# Patient Record
Sex: MALE | Race: WHITE
[De-identification: names, ages, dates, MRNs, and addresses within clinical notes are randomized per-mention and may not be internally consistent; named-entity substitution may affect disease eponyms.]

---

## 2017-01-19 ENCOUNTER — HOSPITAL ENCOUNTER (EMERGENCY)
Dept: HOSPITAL 62 - ER | Age: 27
Discharge: HOME | End: 2017-01-19
Payer: MEDICARE

## 2017-01-19 VITALS — SYSTOLIC BLOOD PRESSURE: 110 MMHG | DIASTOLIC BLOOD PRESSURE: 70 MMHG

## 2017-01-19 DIAGNOSIS — R06.7: ICD-10-CM

## 2017-01-19 DIAGNOSIS — J06.9: Primary | ICD-10-CM

## 2017-01-19 DIAGNOSIS — B97.89: ICD-10-CM

## 2017-01-19 DIAGNOSIS — R09.89: ICD-10-CM

## 2017-01-19 DIAGNOSIS — R05: ICD-10-CM

## 2017-01-19 PROCEDURE — 71020: CPT

## 2017-01-19 PROCEDURE — 99283 EMERGENCY DEPT VISIT LOW MDM: CPT

## 2017-01-19 NOTE — ER DOCUMENT REPORT
ED General





- General


Chief Complaint: Cough


Stated Complaint: COUGH


Mode of Arrival: Ambulatory


Notes: 


26-year-old male here with complaints of cough productive of clear sputum 

sneezing runny nose over the past 2 weeks.  He has not had any fevers or 

chills.  He states that his grandmother was initially sick with similar 

symptoms and then he became sick.  His mother then developed the same symptoms 

however she improved within 7 days.  He has tried TheraFlu and DayQuil with 

some relief.  He has also tried Tessalon Perles and "they are absolutely 

wonderful".


TRAVEL OUTSIDE OF THE [place] IN LAST 30 DAYS: No





- Related Data


Allergies/Adverse Reactions: 


 





Penicillins Allergy (Severe, Verified 01/19/17 10:17)


 Hives











Past Medical History





- General


Information source: Patient





- Social History


Smoking Status: Never Smoker


Chew tobacco use (# tins/day): No


Frequency of alcohol use: None


Drug Abuse: None


Family History: Reviewed & Not Pertinent


Patient has suicidal ideation: No


Patient has homicidal ideation: No





- Past Medical History


Cardiac Medical History: 


   Denies: Hx Coronary Artery Disease, Hx Heart Attack, Hx Hypertension


Pulmonary Medical History: 


   Denies: Hx Asthma, Hx Bronchitis, Hx COPD, Hx Pneumonia


Neurological Medical History: Reports: Hx Seizures - as child.  Denies: Hx 

Cerebrovascular Accident


Renal/ Medical History: Denies: Hx Peritoneal Dialysis


GI Medical History: Denies: Hx Hepatitis, Hx Hiatal Hernia, Hx Ulcer


Musculoskeltal Medical History: Denies Hx Arthritis


Infectious Medical History: Denies: Hx Hepatitis


Past Surgical History: Denies: Hx Open Heart Surgery, Hx Pacemaker





- Immunizations


Hx Diphtheria, Pertussis, Tetanus Vaccination: Yes





Review of Systems





- Review of Systems


Notes: 


See history of present illness for pertinent positive review of systems; 

otherwise all review of systems have been reviewed and are negative





Physical Exam





- Vital signs


Vitals: 


 











Temp Pulse Resp BP Pulse Ox


 


 98.8 F   122 H  16   98/71 L  97 


 


 01/19/17 10:19  01/19/17 10:19  01/19/17 10:19  01/19/17 10:19 01/19/17 10:19














- Notes


Notes: 


PHYSICAL EXAMINATION:





GENERAL: Well-appearing and in no acute distress.





HEAD: Atraumatic, normocephalic.





EYES: Pupils equal round and reactive to light, extraocular movements intact, 

sclera anicteric, conjunctiva are normal.





ENT: nares patent, oropharynx minimal erythema without tonsillar swelling or 

exudates.  Moist mucous membranes.





NECK: Normal range of motion, supple without lymphadenopathy





LUNGS: CTAB and equal.  No wheezes rales or rhonchi.





HEART: Regular rhythm without murmurs; mildly tachycardic





ABDOMEN: Soft, no tenderness.  No guarding, no rebound





EXTREMITIES: Normal range of motion, no pitting edema.  No cyanosis.





NEUROLOGICAL: Cranial nerves grossly intact. Normal sensory/motor exams.





PSYCH: Normal mood, normal affect.





SKIN: Warm, Dry, normal turgor, no rashes or lesions noted





Course





- Re-evaluation


Re-evalutation: 


01/19/17 10:44


MEDICAL DECISION MAKING:


Concern for upper respiratory infection versus pneumonia versus allergic 

rhinitis


Will obtain x-ray to evaluate for pneumonia and give dose of Tessalon Perle


Patient understands and agrees to the plan of care





01/19/17 11:33


CXR negative for pneumonia


Will treat w azithromycin as this may be walking pneumonia


Instructed f/u PCP next few days


Home w rx azithromycin and tessalon perles





- Vital Signs


Vital signs: 


 











Temp Pulse Resp BP Pulse Ox


 


 98.8 F   122 H  20   98/71 L  97 


 


 01/19/17 10:19  01/19/17 10:19  01/19/17 10:45  01/19/17 10:19  01/19/17 10:19














Discharge





- Discharge


Clinical Impression: 


Upper respiratory infection


Qualifiers:


 URI type: unspecified viral URI Qualified Code(s): J06.9 - Acute upper 

respiratory infection, unspecified; B97.89 - Other viral agents as the cause of 

diseases classified elsewhere





Condition: Good


Disposition: HOME, SELF-CARE


Additional Instructions: 


You were seen in the emergency department at [facility].  Finish 

antibiotics and do not skip any doses.  Use the cough medication as needed.  

Please followup with your primary physician in the next few days for further 

management/evaluation.  Please return to the emergency department for worsening 

of symptoms or any symptom that you deem to be concerning or life-threatening.  

Thank you for allowing us to be part of your care.


Prescriptions: 


Benzonatate [Tessalon Perle 100 mg Capsule] 100 mg PO Q8HP PRN #20 cap


 PRN Reason: 


Azithromycin [Zithromax 250 mg Tablet] 250 mg PO ASDIR #4 tablet

## 2017-01-19 NOTE — ER DOCUMENT REPORT
ED Medical Screen (RME)





- General


Chief Complaint: Cough


Stated Complaint: COUGH


Time seen by provider: 10:20


Mode of Arrival: Ambulatory


Information source: Patient


Notes: 


36-year-old male presents to ED for cough cold for 2 weeks.  Denies fever 

nausea or vomiting denies chest pain.


TRAVEL OUTSIDE OF THE [place] IN LAST 30 DAYS: No





- HPI


Onset: Other - 2 weeks


Onset/Duration: Gradual, Persistent


Quality of pain: No pain


Severity: None


Pain Level: Denies


Associated Symptoms: Cough (nonproductive).  denies: Body/muscle aches, Fever


Exacerbated by: Denies


Relieved by: Denies


Similar symptoms previously: Yes


Recently seen / treated by doctor: No





- Related Data


Smoking: Non-smoker


Frequency of alcohol use: None


Drug Abuse: None


Allergies/Adverse Reactions: 


 





Penicillins Allergy (Severe, Verified 01/19/17 10:17)


 Hives











Past Medical History





- Past Medical History


Cardiac Medical History: 


   Denies: Hx Coronary Artery Disease, Hx Heart Attack, Hx Hypertension


Pulmonary Medical History: 


   Denies: Hx Asthma, Hx Bronchitis, Hx COPD, Hx Pneumonia


Neurological Medical History: Reports: Hx Seizures - as child.  Denies: Hx 

Cerebrovascular Accident


GI Medical History: Denies: Hx Hepatitis, Hx Hiatal Hernia, Hx Ulcer


Musculoskeltal Medical History: Denies Hx Arthritis


Infectious Medical History: Denies: Hx Hepatitis


Past Surgical History: Denies: Hx Open Heart Surgery, Hx Pacemaker





- Immunizations


Hx Diphtheria, Pertussis, Tetanus Vaccination: Yes

## 2017-02-09 ENCOUNTER — HOSPITAL ENCOUNTER (EMERGENCY)
Dept: HOSPITAL 62 - ER | Age: 27
Discharge: HOME | End: 2017-02-09
Payer: MEDICARE

## 2017-02-09 VITALS — DIASTOLIC BLOOD PRESSURE: 65 MMHG | SYSTOLIC BLOOD PRESSURE: 100 MMHG

## 2017-02-09 DIAGNOSIS — R05: ICD-10-CM

## 2017-02-09 DIAGNOSIS — R09.81: ICD-10-CM

## 2017-02-09 DIAGNOSIS — J45.909: Primary | ICD-10-CM

## 2017-02-09 PROCEDURE — 99281 EMR DPT VST MAYX REQ PHY/QHP: CPT

## 2017-02-09 NOTE — ER DOCUMENT REPORT
ED Medical Screen (RME)





- General


Stated Complaint: COUGH


Notes: 


patient has a history of asthma and has run out nebulizers for atrovent. 





lungs CTAB








I have greeted and performed a rapid initial assessment of this patient. A 

comprehensive ED assessment and evaluation of the patient, analysis of test 

results and completion of the medical decision making process will be conducted 

by additional ED providers.


TRAVEL OUTSIDE OF THE [place] IN LAST 30 DAYS: No





- Related Data


Allergies/Adverse Reactions: 


 





Penicillins Allergy (Severe, Verified 01/19/17 10:17)


 Hives











Past Medical History





- Past Medical History


Cardiac Medical History: 


   Denies: Hx Coronary Artery Disease, Hx Heart Attack, Hx Hypertension


Pulmonary Medical History: 


   Denies: Hx Asthma, Hx Bronchitis, Hx COPD, Hx Pneumonia


Neurological Medical History: Reports: Hx Seizures - as child.  Denies: Hx 

Cerebrovascular Accident


Renal/ Medical History: Denies: Hx Peritoneal Dialysis


GI Medical History: Denies: Hx Hepatitis, Hx Hiatal Hernia, Hx Ulcer


Musculoskeltal Medical History: Denies Hx Arthritis


Infectious Medical History: Denies: Hx Hepatitis


Past Surgical History: Denies: Hx Open Heart Surgery, Hx Pacemaker





- Immunizations


Hx Diphtheria, Pertussis, Tetanus Vaccination: Yes

## 2017-02-09 NOTE — ER DOCUMENT REPORT
ED General





- General


Chief Complaint: Medication Refill


Stated Complaint: COUGH


Notes: 


Patient is a 26-year-old male presents with complaint of being out of his 

asthma inhaler.  He's had some coughing congestion.  No wheezing.  No fevers.  

No other complaints at this time.  He presents with a text from his mom on this 

found that he has been read.  The text says that he needs to inhalers.  He 

needs one for school and 1 for gym.  She asks that we label the inhalers as 1 

for gym.  She also text to ask if he needed antibiotics or prednisone.  Patient 

himself says he otherwise feels well.  His no further concerns.  He says he has 

a learning disability and that is why he wanted to repeat a text from his mom 

in case he forgot something to tell me.


TRAVEL OUTSIDE OF THE [place] IN LAST 30 DAYS: No





- Related Data


Allergies/Adverse Reactions: 


 





Penicillins Allergy (Severe, Verified 02/09/17 21:14)


 Hives











Past Medical History





- Social History


Smoking Status: Unknown if Ever Smoked


Frequency of alcohol use: None


Drug Abuse: None


Family History: Reviewed & Not Pertinent


Patient has suicidal ideation: No


Patient has homicidal ideation: No





- Past Medical History


Cardiac Medical History: 


   Denies: Hx Coronary Artery Disease, Hx Heart Attack, Hx Hypertension


Pulmonary Medical History: 


   Denies: Hx Asthma, Hx Bronchitis, Hx COPD, Hx Pneumonia


Neurological Medical History: Reports: Hx Seizures - as child.  Denies: Hx 

Cerebrovascular Accident


Renal/ Medical History: Denies: Hx Peritoneal Dialysis


GI Medical History: Denies: Hx Hepatitis, Hx Hiatal Hernia, Hx Ulcer


Musculoskeltal Medical History: Denies Hx Arthritis


Infectious Medical History: Denies: Hx Hepatitis


Past Surgical History: Denies: Hx Open Heart Surgery, Hx Pacemaker





- Immunizations


Hx Diphtheria, Pertussis, Tetanus Vaccination: Yes





Review of Systems





- Review of Systems


Notes: 


My Normal Review Basic





REVIEW OF SYSTEMS:


CONSTITUTIONAL :  Denies fever,  chills, or sweats.  Denies recent illness.


EENT: Some nasal congestion


RESPIRATORY: Some cough


GASTROINTESTINAL:  Denies abdominal pain.  Denies nausea, vomiting, or 

diarrhea.  Denies constipation.  Last BM: 


MUSCULOSKELETAL:  Denies neck or back pain or joint pain or swelling.


SKIN:   Denies rash or skin lesions.


NEUROLOGICAL:  Denies altered mental status or loss of consciousness.  


ALL OTHER SYSTEMS REVIEWED AND NEGATIVE.





Physical Exam





- Vital signs


Vitals: 


 











Temp Pulse Resp BP Pulse Ox


 


 97.8 F   77   18   100/65   98 


 


 02/09/17 21:14  02/09/17 21:14  02/09/17 21:14  02/09/17 21:14  02/09/17 21:14














- Notes


Notes: 


General Appearance: Well nourished, alert, cooperative, no acute distress, no 

obvious discomfort.  Very well-appearing.  No cough or nasal congestion during 

exam.


Vitals: reviewed, See vital signs table.


Head: no swelling or tenderness to the head


Eyes: PERRL, EOMI, Conjuctiva clear


Mouth: No decreasd moisture


Throat: No tonsillar inflammation, No airway obstruction,  No lymphadenopathy


Neck: Supple, no neck tenderness, No thyromegaly


Lungs: No wheezing, No rales, No rhonci, No accessory muscle use, good air 

exchange bilaterally.


Heart: Normal rate, Regular rythm, No murmur, no rub


Abdomen: Normal BS, soft, No rigidity, No abdominal tenderness, No guarding, no 

rebound, no abdominal masses, no organomegaly


Extremities:  good pulses in all extremities, no swelling or tenderness in the 

extremities, no edema.


Skin: warm, dry, appropriate color, no rash


Neuro: speech clear, oriented x 3, normal affect, responds appropriately to 

questions.





Course





- Vital Signs


Vital signs: 


 











Temp Pulse Resp BP Pulse Ox


 


 97.8 F   77   18   100/65   98 


 


 02/09/17 21:14  02/09/17 21:14  02/09/17 21:14  02/09/17 21:14  02/09/17 21:14














- Transfer of Care


Notes: 





02/09/17 22:45


Patient is very well-appearing.  He has no wheezing on exam.  His no increased 

work of breathing.  He had no cough during exam.  His no congestion on exam.  I 

did give him 1 inhaler here and wrote a prescription for second inhaler.  I 

informed him to have his mother labeled inhalers for him as to which one he 

should he wants for gym in which one he wants for other.  Patient has no 

wheezing and no fever.  I do not think he needs steroids or antibiotics this 

time.  Patient encouraged return to ER if he feels worse or has any further 

concerns.  Patient agrees with plan will be discharged home.





Dictation of this chart was performed using voice recognition software; 

therefore, there may be some unintended grammatical errors.





Discharge





- Discharge


Clinical Impression: 


Asthma


Qualifiers:


 Asthma severity: unspecified severity Asthma complication type: uncomplicated 

Qualified Code(s): J45.909 - Unspecified asthma, uncomplicated





Condition: Good


Disposition: HOME, SELF-CARE


Additional Instructions: 


Please return to ER immediately if you have a lot of wheezing, difficulty 

breathing, fevers, or feel unwell.  You can use your inhaler as 2 puffs every 4 

hours as needed for difficulty breathing.


Prescriptions: 


Albuterol Sulfate [Proair HFA Inhalation Aerosol 8.5 gm MDI] 2 puff IH Q4H PRN #

1 mdi


 PRN Reason: 


Forms:  Return to School

## 2017-02-11 ENCOUNTER — HOSPITAL ENCOUNTER (EMERGENCY)
Dept: HOSPITAL 62 - ER | Age: 27
Discharge: HOME | End: 2017-02-11
Payer: MEDICAID

## 2017-02-11 VITALS — SYSTOLIC BLOOD PRESSURE: 113 MMHG | DIASTOLIC BLOOD PRESSURE: 65 MMHG

## 2017-02-11 DIAGNOSIS — J45.909: Primary | ICD-10-CM

## 2017-02-11 DIAGNOSIS — Z88.0: ICD-10-CM

## 2017-02-11 DIAGNOSIS — J04.30: ICD-10-CM

## 2017-02-11 PROCEDURE — 99283 EMERGENCY DEPT VISIT LOW MDM: CPT

## 2017-02-11 NOTE — ER DOCUMENT REPORT
ED General





- General


Chief Complaint: Flu Symptoms


Stated Complaint: FLU LIKE SYMPTOMS


TRAVEL OUTSIDE OF THE [place] IN LAST 30 DAYS: No





- HPI


Patient complains to provider of: cough sinus pressure wheezing


Notes: 


Patient coming in with about states symptoms ongoing for the last 3-4 days.  

Patient states he was recent seen and was given a prescription for an inhaler 

stated that he also at that time thought he would improve with prednisone and 

want to ask for vials of albuterol however stated that the provider was dizzy 

before the past with his medications.  Patient otherwise states no fevers no 

recent antibiotics no recent steroids.  Patient otherwise is alert


Distress upon my evaluation.





- Related Data


Allergies/Adverse Reactions: 


 





Penicillins Allergy (Severe, Verified 02/09/17 21:14)


 Hives











Past Medical History





- Social History


Smoking Status: Unknown if Ever Smoked


Family History: Reviewed & Not Pertinent


Patient has suicidal ideation: No


Patient has homicidal ideation: No





- Past Medical History


Cardiac Medical History: 


   Denies: Hx Coronary Artery Disease, Hx Heart Attack, Hx Hypertension


Pulmonary Medical History: 


   Denies: Hx Asthma, Hx Bronchitis, Hx COPD, Hx Pneumonia


Neurological Medical History: Reports: Hx Seizures - as child.  Denies: Hx 

Cerebrovascular Accident


Renal/ Medical History: Denies: Hx Peritoneal Dialysis


GI Medical History: Denies: Hx Hepatitis, Hx Hiatal Hernia, Hx Ulcer


Musculoskeltal Medical History: Denies Hx Arthritis


Infectious Medical History: Denies: Hx Hepatitis


Past Surgical History: Denies: Hx Open Heart Surgery, Hx Pacemaker





- Immunizations


Hx Diphtheria, Pertussis, Tetanus Vaccination: Yes





Review of Systems





- Review of Systems


Constitutional: No symptoms reported


EENT: No symptoms reported


Cardiovascular: No symptoms reported


Respiratory: Cough


Gastrointestinal: No symptoms reported


Genitourinary: No symptoms reported


Male Genitourinary: No symptoms reported


Musculoskeletal: No symptoms reported


Skin: No symptoms reported


Hematologic/Lymphatic: No symptoms reported


Neurological/Psychological: No symptoms reported


-: Yes All other systems reviewed and negative





Physical Exam





- Vital signs


Vitals: 


 











Temp Pulse BP Pulse Ox


 


 98.0 F   79   121/64   95 


 


 02/11/17 05:54  02/11/17 05:54  02/11/17 05:54  02/11/17 05:54











Interpretation: Normal





- General


General appearance: Appears well, Alert





- HEENT


Head: Normocephalic, Atraumatic


Eyes: Normal


Pupils: PERRL





- Respiratory


Respiratory status: No respiratory distress


Chest status: Nontender


Breath sounds: Normal


Chest palpation: Normal





- Cardiovascular


Rhythm: Regular


Heart sounds: Normal auscultation


Murmur: No





- Abdominal


Inspection: Normal


Distension: No distension


Bowel sounds: Normal


Tenderness: Nontender


Organomegaly: No organomegaly





- Back


Back: Normal, Nontender





- Extremities


General upper extremity: Normal inspection, Nontender, Normal color, Normal ROM

, Normal temperature


General lower extremity: Normal inspection, Nontender, Normal color, Normal ROM

, Normal temperature, Normal weight bearing.  No: Beto's sign





- Neurological


Neuro grossly intact: Yes


Cognition: Normal


Orientation: AAOx4


Reba Coma Scale Eye Opening: Spontaneous


[place] Coma Scale Verbal: Oriented


[place] Coma Scale Motor: Obeys Commands


[place] Coma Scale Total: 15


Speech: Normal


Motor strength normal: LUE, RUE, LLE, RLE


Sensory: Normal





- Psychological


Associated symptoms: Normal affect, Normal mood





- Skin


Skin Temperature: Warm


Skin Moisture: Dry


Skin Color: Normal





Course





- Re-evaluation


Re-evalutation: 





02/11/17 13:07


Patient will be treated with albuterol and prednisone.  Prescription provided 

for nebulizer vials.  Patient will be discharged home





- Vital Signs


Vital signs: 


 











Temp Pulse Resp BP Pulse Ox


 


 97.9 F   70   20   113/65   98 


 


 02/11/17 07:23  02/11/17 07:23  02/11/17 07:23  02/11/17 07:23  02/11/17 07:23














Discharge





- Discharge


Clinical Impression: 


Asthma


Qualifiers:


 Asthma severity: unspecified severity Asthma complication type: uncomplicated 

Qualified Code(s): J45.909 - Unspecified asthma, uncomplicated





URI (upper respiratory infection)


Qualifiers:


 URI type: supraglottitis Airway obstruction: without obstruction Qualified Code

(s): J04.30 - Supraglottitis, unspecified, without obstruction





Condition: Good


Disposition: HOME, SELF-CARE


Instructions:  Upper Respiratory Illness ([facility]), Asthma ([facility])


Additional Instructions: 


Take medication as prescribed.


Prescriptions: 


Albuterol Sulfate [Albuterol Sulfate 2.5mg/3 mL] 2.5 mg IH Q4 #30 ml


Prednisone [Deltasone 20 mg Tablet] 40 mg PO DAILY 5 Days

## 2017-08-03 ENCOUNTER — HOSPITAL ENCOUNTER (EMERGENCY)
Dept: HOSPITAL 62 - ER | Age: 27
Discharge: LEFT BEFORE DISCHARGE | End: 2017-08-03
Payer: MEDICARE

## 2017-08-03 VITALS — SYSTOLIC BLOOD PRESSURE: 127 MMHG | DIASTOLIC BLOOD PRESSURE: 76 MMHG

## 2017-08-03 DIAGNOSIS — L98.9: ICD-10-CM

## 2017-08-03 DIAGNOSIS — Z53.9: Primary | ICD-10-CM

## 2017-08-04 ENCOUNTER — HOSPITAL ENCOUNTER (EMERGENCY)
Dept: HOSPITAL 62 - ER | Age: 27
Discharge: HOME | End: 2017-08-04
Payer: MEDICARE

## 2017-08-04 VITALS — SYSTOLIC BLOOD PRESSURE: 120 MMHG | DIASTOLIC BLOOD PRESSURE: 72 MMHG

## 2017-08-04 DIAGNOSIS — L23.7: Primary | ICD-10-CM

## 2017-08-04 DIAGNOSIS — Z88.0: ICD-10-CM

## 2017-08-04 PROCEDURE — 99283 EMERGENCY DEPT VISIT LOW MDM: CPT

## 2017-08-04 NOTE — ER DOCUMENT REPORT
ED General





- General


Chief Complaint: Skin Problem


Stated Complaint: SKIN PROBLEM


Time Seen by Provider: 08/04/17 07:35


TRAVEL OUTSIDE OF THE [place] IN LAST 30 DAYS: No





- HPI


Patient complains to provider of: Poison ivy left arm


Notes: 


Patient is coming to the ER today for evaluation of poison ivy on his left arm.

  Patient states that this is very pruritic.  Patient denies taking or using 

any Benadryl cream steroid cream or oral Benadryl for his symptoms is that he 

does not have any at home.  Patient otherwise denies any past medical history.  

Patient was initially triaged around 11:00 night prior also at that time 

requesting evaluation for his poison ivy and a refill of his asthma inhalers.  

Patient alert prior to being seen.  Patient denies any fever chills nausea 

vomiting diarrhea





- Related Data


Allergies/Adverse Reactions: 


 





Penicillins Allergy (Severe, Verified 02/09/17 21:14)


 Hives











Past Medical History





- Social History


Smoking Status: Unknown if Ever Smoked


Family History: Reviewed & Not Pertinent


Patient has suicidal ideation: No


Patient has homicidal ideation: No





- Past Medical History


Cardiac Medical History: 


   Denies: Hx Coronary Artery Disease, Hx Heart Attack, Hx Hypertension


Pulmonary Medical History: 


   Denies: Hx Asthma, Hx Bronchitis, Hx COPD, Hx Pneumonia


Neurological Medical History: Reports: Hx Seizures - as child.  Denies: Hx 

Cerebrovascular Accident


Renal/ Medical History: Denies: Hx Peritoneal Dialysis


GI Medical History: Denies: Hx Hepatitis, Hx Hiatal Hernia, Hx Ulcer


Musculoskeltal Medical History: Denies Hx Arthritis


Infectious Medical History: Denies: Hx Hepatitis


Past Surgical History: Denies: Hx Open Heart Surgery, Hx Pacemaker





- Immunizations


Hx Diphtheria, Pertussis, Tetanus Vaccination: Yes





Review of Systems





- Review of Systems


Constitutional: No symptoms reported


EENT: No symptoms reported


Cardiovascular: No symptoms reported


Respiratory: No symptoms reported


Gastrointestinal: No symptoms reported


Genitourinary: No symptoms reported


Male Genitourinary: No symptoms reported


Musculoskeletal: No symptoms reported


Skin: Other - rash left forearm


Hematologic/Lymphatic: No symptoms reported


Neurological/Psychological: No symptoms reported





Physical Exam





- Vital signs


Vitals: 


 











Temp Pulse Resp BP Pulse Ox


 


 98.3 F   73   16   120/72   98 


 


 08/04/17 07:06  08/04/17 07:06  08/04/17 07:06  08/04/17 07:06  08/04/17 07:06











Interpretation: Normal





- General


General appearance: Appears well, Alert





- HEENT


Head: Normocephalic, Atraumatic


Eyes: Normal


Pupils: PERRL





- Respiratory


Respiratory status: No respiratory distress


Chest status: Nontender


Breath sounds: Normal


Chest palpation: Normal





- Cardiovascular


Rhythm: Regular


Heart sounds: Normal auscultation


Murmur: No





- Abdominal


Inspection: Normal


Distension: No distension


Bowel sounds: Normal


Tenderness: Nontender


Organomegaly: No organomegaly





- Back


Back: Normal, Nontender





- Extremities


General upper extremity: Normal inspection, Nontender, Normal color, Normal ROM

, Normal temperature


General lower extremity: Normal inspection, Nontender, Normal color, Normal ROM

, Normal temperature, Normal weight bearing.  No: Beto's sign





- Neurological


Neuro grossly intact: Yes


Cognition: Normal


Orientation: AAOx4


Reba Coma Scale Eye Opening: Spontaneous


[place] Coma Scale Verbal: Oriented


[place] Coma Scale Motor: Obeys Commands


[place] Coma Scale Total: 15


Speech: Normal


Motor strength normal: LUE, RUE, LLE, RLE


Sensory: Normal





- Psychological


Associated symptoms: Normal affect, Normal mood





- Skin


Skin Temperature: Warm


Skin Moisture: Dry


Skin Color: Other - Patient with mild blistering contact dermatitis consistent 

with poison ivy exposure in the antecubital fossa left arm no other signs of 

rashes on the patient's torso





Course





- Re-evaluation


Re-evalutation: 





08/04/17 15:10


Patient will be given a prescription for prednisone encouraged to use over-the-

counter Benadryl cream and tablets for control of the itching.  At discharge I 

was informed by nursing staff that the patient's mother called requesting a 

shot of steroids however patient is able to tolerate p.o. no need for IM 

injection at this time





- Vital Signs


Vital signs: 


 











Temp Pulse Resp BP Pulse Ox


 


 98.3 F   73   16   120/72   98 


 


 08/04/17 07:09  08/04/17 07:09  08/04/17 07:09  08/04/17 07:09  08/04/17 07:09














Discharge





- Discharge


Clinical Impression: 


 Poison ivy dermatitis





Condition: Good


Disposition: HOME, SELF-CARE


Instructions:  Contact Dermatitis ([facility]), Poison Ivy ([facility]), Use of 

Diphenhydramine, Topical Steroid Cream or Ointment ([facility])


Additional Instructions: 


Please take medication as directed.








Prescriptions: 


Albuterol Sulfate [Proair HFA] 1 - 2 puff IH Q4 PRN #1 inhaler


 PRN Reason: 


Prednisone [Deltasone 20 mg Tablet] 3 tab PO DAILY 5 Days

## 2018-10-08 ENCOUNTER — HOSPITAL ENCOUNTER (EMERGENCY)
Dept: HOSPITAL 62 - ER | Age: 28
Discharge: HOME | End: 2018-10-08
Payer: MEDICARE

## 2018-10-08 VITALS — DIASTOLIC BLOOD PRESSURE: 87 MMHG | SYSTOLIC BLOOD PRESSURE: 134 MMHG

## 2018-10-08 DIAGNOSIS — R21: Primary | ICD-10-CM

## 2018-10-08 DIAGNOSIS — L29.8: ICD-10-CM

## 2018-10-08 DIAGNOSIS — Z88.0: ICD-10-CM

## 2018-10-08 PROCEDURE — 99282 EMERGENCY DEPT VISIT SF MDM: CPT

## 2018-10-08 NOTE — ER DOCUMENT REPORT
ED General





- General


Chief Complaint: Rash


Stated Complaint: RASH


Time Seen by Provider: 10/08/18 03:40


Notes: 


Patient is a 28-year-old male who presents with complaint of a rash.  He says 

he was working outside and exposed to poison ivy.  He has some rash on his 

hands and his arm.  Also somewhat over the dorsum of his foot and toes from 

where he is wearing sandals.  He says it is pruritic.  Some painful.  No 

fevers.  No difficulty breathing.  No other complaints at this time.





TRAVEL OUTSIDE OF THE [place] IN LAST 30 DAYS: No





- Related Data


Allergies/Adverse Reactions: 


 





Penicillins Allergy (Severe, Verified 02/09/17 21:14)


 Hives











Past Medical History





- Social History


Smoking Status: Never Smoker


Frequency of alcohol use: None


Drug Abuse: None


Family History: Reviewed & Not Pertinent


Patient has suicidal ideation: No


Patient has homicidal ideation: No





- Past Medical History


Cardiac Medical History: 


   Denies: Hx Coronary Artery Disease, Hx Heart Attack, Hx Hypertension


Pulmonary Medical History: 


   Denies: Hx Asthma, Hx Bronchitis, Hx COPD, Hx Pneumonia


Neurological Medical History: Reports: Hx Seizures - as child.  Denies: Hx 

Cerebrovascular Accident


Renal/ Medical History: Denies: Hx Peritoneal Dialysis


GI Medical History: Denies: Hx Hepatitis, Hx Hiatal Hernia, Hx Ulcer


Musculoskeletal Medical History: Denies Hx Arthritis


Infectious Medical History: Denies: Hx Hepatitis


Past Surgical History: Denies: Hx Open Heart Surgery, Hx Pacemaker





- Immunizations


Hx Diphtheria, Pertussis, Tetanus Vaccination: Yes





Review of Systems





- Review of Systems


Notes: 





My Normal Review Basic





REVIEW OF SYSTEMS:


CONSTITUTIONAL :  Denies fever,  chills, or sweats.  Denies recent illness.


EENT:   Denies eye, ear, throat, or mouth pain or symptoms.  Denies nasal or 

sinus congestion.


RESPIRATORY:  Denies cough, cold, or chest congestion.  Denies shortness of 

breath, difficulty breathing, or wheezing.


GASTROINTESTINAL:  Denies abdominal pain.  Denies nausea, vomiting, or 

diarrhea.  Denies constipation.  Last BM: 


SKIN:   Rash








Physical Exam





- Vital signs


Vitals: 





 











Temp Pulse Resp BP Pulse Ox


 


 97.8 F   90   18   134/87 H  98 


 


 10/08/18 03:24  10/08/18 03:24  10/08/18 03:24  10/08/18 03:24  10/08/18 03:24














- Notes


Notes: 





General Appearance: Well nourished, alert, cooperative, no acute distress, no 

obvious discomfort.  Appearing.


Vitals: reviewed, See vital signs table.


Eyes: PERRL, EOMI, Conjuctiva clear


Mouth: No decreasd moisture


Neck: Supple, No swelling


Lungs: No wheezing, No rales, No rhonci, No accessory muscle use, good air 

exchange bilaterally.


Extremities: good pulses in all extremities, no swelling or tenderness in the 

extremities, no edema.


Skin: Mild maculopapular rash is easily blanchable and pruritic.  Nonpainful to 

palpation.  Consistent with a contact dermatitis.


Neuro: speech clear, oriented x 3, normal affect, responds appropriately to 

questions.





Course





- Re-evaluation


Re-evalutation: 





10/08/18 04:08


    Patient has a rash and history consistent with poison ivy exposure.  

Patient will be placed on prednisone.  He does not have any respiratory or 

pharyngeal symptoms.  I encourage him return to ER immediately for difficulty 

breathing, difficulty swallowing, worsening of the rash, or if he feels unwell.

  Patient agrees with plan will be discharged home.





Dictation of this chart was performed using voice recognition software; 

therefore, there may be some unintended grammatical errors.





- Vital Signs


Vital signs: 





 











Temp Pulse Resp BP Pulse Ox


 


 97.8 F   90   18   134/87 H  98 


 


 10/08/18 03:24  10/08/18 03:24  10/08/18 03:24  10/08/18 03:24  10/08/18 03:24














Discharge





- Discharge


Clinical Impression: 


 Rash





Condition: Good


Disposition: HOME, SELF-CARE


Additional Instructions: 


Please take the prednisone as prescribed. please take Benadryl as 25mg every 6 

hours for itching. Please return to the ER if you have worsening of the rash, 

fevers, difficulty breathing, or feel unwell.


Prescriptions: 


Prednisone 10 mg PO ASDIR #42 tablet

## 2018-10-25 ENCOUNTER — HOSPITAL ENCOUNTER (EMERGENCY)
Dept: HOSPITAL 62 - ER | Age: 28
Discharge: HOME | End: 2018-10-25
Payer: MEDICARE

## 2018-10-25 VITALS — DIASTOLIC BLOOD PRESSURE: 70 MMHG | SYSTOLIC BLOOD PRESSURE: 123 MMHG

## 2018-10-25 DIAGNOSIS — Z88.0: ICD-10-CM
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
## 2018-10-25 DIAGNOSIS — S93.402A: ICD-10-CM

## 2018-10-25 DIAGNOSIS — M25.572: Primary | ICD-10-CM
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
## 2018-10-25 PROCEDURE — 2W3RX1Z IMMOBILIZATION OF LEFT LOWER LEG USING SPLINT: ICD-10-PCS | Performed by: EMERGENCY MEDICINE

## 2018-10-25 PROCEDURE — 29515 APPLICATION SHORT LEG SPLINT: CPT

## 2018-10-25 PROCEDURE — 99283 EMERGENCY DEPT VISIT LOW MDM: CPT

## 2018-10-25 PROCEDURE — L1902 AFO ANKLE GAUNTLET PRE OTS: HCPCS

## 2018-10-25 PROCEDURE — 73610 X-RAY EXAM OF ANKLE: CPT

## 2018-10-25 NOTE — RADIOLOGY REPORT (SQ)
CLINICAL HISTORY:  lat malleolar tender-swelling 



COMPARISON: None.



TECHNIQUE: XR ANKLE 3 OR MORE VIEWS 10/25/2018 1:26 AM CDT



FINDINGS: 



There is no fracture. Joint spaces are preserved.  There is mild

lateral soft tissue swelling.



IMPRESSION: 



No acute osseous findings.

## 2019-02-08 ENCOUNTER — HOSPITAL ENCOUNTER (EMERGENCY)
Dept: HOSPITAL 62 - ER | Age: 29
Discharge: HOME | End: 2019-02-08
Payer: MEDICARE

## 2019-02-08 VITALS — SYSTOLIC BLOOD PRESSURE: 125 MMHG | DIASTOLIC BLOOD PRESSURE: 79 MMHG

## 2019-02-08 DIAGNOSIS — S93.409D: Primary | ICD-10-CM
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
## 2019-02-08 PROCEDURE — 99282 EMERGENCY DEPT VISIT SF MDM: CPT

## 2019-02-08 NOTE — ER DOCUMENT REPORT
HPI





- HPI


Patient complains to provider of: Need for a new ankle splint


Time Seen by Provider: 02/08/19 09:22


Quality of pain: No pain, Achy


Pain Level: Denies


Context: 





Patient states that he was treated for a sprained ankle 4 months ago and was 

given a stirrup splint.  Patient states he is continue to wear the splint every 

day since then and it is starting to become worn and he is requesting a new 

splint today.  Patient denies any new trauma to the ankle.  Patient denies 

following up with either her primary doctor or with orthopedics.  Patient states

that he only has pain if he rolls his ankle laterally but does not have pain 

otherwise.


Associated Symptoms: Other


Exacerbated by: Movement


Relieved by: Denies


Similar symptoms previously: Yes


Recently seen / treated by doctor: No





- ROS


ROS below otherwise negative: Yes


Systems Reviewed and Negative: Yes All other systems reviewed and negative





- NEURO


Neurology: DENIES: Weakness





- MUSCULOSKELETAL


Musculoskeletal: REPORTS: Extremity pain.  DENIES: Swelling





- DERM


Skin Color: Normal


Skin Problems: None





Past Medical History





- General


Information source: Patient





- Social History


Smoking Status: Never Smoker


Frequency of alcohol use: None


Drug Abuse: None


Occupation: none


Family History: Reviewed & Not Pertinent


Patient has suicidal ideation: No


Patient has homicidal ideation: No





- Medical History


Medical History: Other - Learning disability





- Past Medical History


Cardiac Medical History: 


Pulmonary Medical History: 


Neurological Medical History: Reports: Hx Seizures - as child.  Denies: Hx 

Cerebrovascular Accident


Renal/ Medical History: Denies: Hx Peritoneal Dialysis


Musculoskeletal Medical History: 


Infectious Medical History: Denies: Hx Hepatitis


Surgical Hx: Negative





- Immunizations


Hx Diphtheria, Pertussis, Tetanus Vaccination: Yes





Vertical Provider Document





- CONSTITUTIONAL


Agree With Documented VS: Yes


Exam Limitations: No Limitations


General Appearance: WD/WN, No Apparent Distress





- INFECTION CONTROL


TRAVEL OUTSIDE OF THE [place] IN LAST 30 DAYS: No





- HEENT


HEENT: Atraumatic, Normocephalic





- NECK


Neck: Normal Inspection





- RESPIRATORY


Respiratory: Breath Sounds Normal, No Respiratory Distress





- CARDIOVASCULAR


Cardiovascular: Regular Rate, Regular Rhythm


Pulses: Normal: Dorsalis pedis





- MUSCULOSKELETAL/EXTREMETIES


Musculoskeletal/Extremeties: MAEW, FROM, Non-Tender - Area nontender and less 

patient rolls his ankle laterally to the side





- NEURO


Level of Consciousness: Awake, Alert, Appropriate


Motor/Sensory: No Motor Deficit





- DERM


Integumentary: Warm, Dry, No Rash





Course





- Re-evaluation


Re-evalutation: 





02/08/19 09:47


Consult with Dr. Childs regarding patient presentation.  Agrees with plan 

to not reapply a stirrup splint at this time as patient should be following up 

with orthopedics for further evaluation and should not continue to be 

immobilized in the joint.





- Vital Signs


Vital signs: 


                                        











Temp Pulse Resp BP Pulse Ox


 


 99.5 F   96   16   125/79   96 


 


 02/08/19 08:37  02/08/19 08:37  02/08/19 08:37  02/08/19 08:37  02/08/19 08:37














Discharge





- Discharge


Clinical Impression: 


 request for replacement splint





Condition: Stable


Disposition: HOME, SELF-CARE


Instructions:  Acetaminophen, Use of Over-The-Counter Ibuprofen ([facility])


Additional Instructions: 


Return immediately for any new or worsening symptoms





Followup with your primary care provider, call tomorrow to make a followup 

appointment





You should follow-up with orthopedics





You should not be continue to immobilize the joint.  If you are having any 

persistent pain or problems this is an indication for additional evaluation by 

an orthopedic doctor.  The number will be provided below, call their office 

today for an appointment.


Referrals: 


[facility] [Provider Group] - Follow up tomorrow